# Patient Record
Sex: FEMALE | Race: WHITE | NOT HISPANIC OR LATINO | URBAN - METROPOLITAN AREA
[De-identification: names, ages, dates, MRNs, and addresses within clinical notes are randomized per-mention and may not be internally consistent; named-entity substitution may affect disease eponyms.]

---

## 2017-06-10 ENCOUNTER — EMERGENCY (EMERGENCY)
Facility: HOSPITAL | Age: 22
LOS: 1 days | Discharge: HOME | End: 2017-06-10
Attending: EMERGENCY MEDICINE
Payer: COMMERCIAL

## 2017-06-10 VITALS
SYSTOLIC BLOOD PRESSURE: 111 MMHG | HEART RATE: 143 BPM | OXYGEN SATURATION: 99 % | TEMPERATURE: 98 F | HEIGHT: 63 IN | DIASTOLIC BLOOD PRESSURE: 80 MMHG | WEIGHT: 119.93 LBS | RESPIRATION RATE: 15 BRPM

## 2017-06-10 DIAGNOSIS — F10.129 ALCOHOL ABUSE WITH INTOXICATION, UNSPECIFIED: ICD-10-CM

## 2017-06-10 DIAGNOSIS — R11.10 VOMITING, UNSPECIFIED: ICD-10-CM
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
## 2017-06-10 PROCEDURE — 99284 EMERGENCY DEPT VISIT MOD MDM: CPT | Mod: 25

## 2017-06-10 NOTE — ED PROVIDER NOTE - CONSTITUTIONAL, MLM
normal... Well appearing, well nourished, awake, alert, and in no apparent distress. Intoxicated in ED; unable to answer questions. Dried vomit in hair.

## 2017-06-10 NOTE — ED PROVIDER NOTE - PROGRESS NOTE DETAILS
Spoke with pt's father (phone #: [phone]), who is aware of pt's situation rose marie' pat awake alert now ambulatory to BR without asst.  awaiting  from dad at 7 am

## 2017-06-10 NOTE — ED PROVIDER NOTE - NS ED MD SCRIBE ATTENDING SCRIBE SECTIONS
VITAL SIGNS( Pullset)/HIV/HISTORY OF PRESENT ILLNESS/PAST MEDICAL/SURGICAL/SOCIAL HISTORY/DISPOSITION/REVIEW OF SYSTEMS/PHYSICAL EXAM PHYSICAL EXAM/REVIEW OF SYSTEMS/HIV/HISTORY OF PRESENT ILLNESS/PROGRESS NOTE/VITAL SIGNS( Pullset)/PAST MEDICAL/SURGICAL/SOCIAL HISTORY/DISPOSITION

## 2017-06-10 NOTE — ED PROVIDER NOTE - OBJECTIVE STATEMENT
22 y/o F pt with PMHx Mitral Valve Prolapse and no PSHx BIB EMS to ED with EtOH intoxication today. As per friend, pt was at a concert today where she was drinking EtOH; pt fell and hit her head due to intoxication, and vomited (x1 episode) before being brought to ED. Pt denies numbness, tingling, weakness, or any other complaints. Pt also denies taking daily medications. NKDA.

## 2017-06-11 VITALS
SYSTOLIC BLOOD PRESSURE: 120 MMHG | OXYGEN SATURATION: 100 % | DIASTOLIC BLOOD PRESSURE: 79 MMHG | TEMPERATURE: 98 F | HEART RATE: 98 BPM | RESPIRATION RATE: 17 BRPM

## 2017-06-11 PROCEDURE — 99284 EMERGENCY DEPT VISIT MOD MDM: CPT

## 2017-06-11 RX ORDER — ONDANSETRON 8 MG/1
4 TABLET, FILM COATED ORAL ONCE
Qty: 0 | Refills: 0 | Status: COMPLETED | OUTPATIENT
Start: 2017-06-11 | End: 2017-06-11

## 2017-06-11 RX ADMIN — ONDANSETRON 4 MILLIGRAM(S): 8 TABLET, FILM COATED ORAL at 07:03

## 2017-06-11 NOTE — ED ADULT NURSE NOTE - OBJECTIVE STATEMENT
Patient presented to ED s/p alcohol intoxication while at a concert. Patient's vital signs are stable. Patient is responsive to pain and tactile stimuli.

## 2017-06-11 NOTE — ED ADULT NURSE REASSESSMENT NOTE - NS ED NURSE REASSESS COMMENT FT1
Patient is awaken now, alert and orientedx3, able to speak coherently in full sentences and breathe comfortably in room air. Patient was asked to get up and was observed walking with a steady gait. Denies chest pain , dizziness, SOB, N/V/D. Will continue to monitor.

## 2023-07-19 NOTE — ED PROVIDER NOTE - PHYSICAL EXAMINATION
What Type Of Note Output Would You Prefer (Optional)?: Standard Output How Severe Are Your Spot(S)?: moderate Have Your Spot(S) Been Treated In The Past?: has not been treated Hpi Title: Evaluation of Skin Lesions HEAD: No bruising on head; no evidence of trauma, no abrasions.